# Patient Record
Sex: FEMALE | Race: WHITE | URBAN - METROPOLITAN AREA
[De-identification: names, ages, dates, MRNs, and addresses within clinical notes are randomized per-mention and may not be internally consistent; named-entity substitution may affect disease eponyms.]

---

## 2023-10-03 ENCOUNTER — TELEMEDICINE (OUTPATIENT)
Dept: FAMILY MEDICINE CLINIC | Facility: CLINIC | Age: 28
End: 2023-10-03
Payer: MEDICAID

## 2023-10-03 DIAGNOSIS — L70.9 ACNE, UNSPECIFIED ACNE TYPE: Primary | ICD-10-CM

## 2023-10-03 DIAGNOSIS — F90.9 ATTENTION DEFICIT HYPERACTIVITY DISORDER (ADHD), UNSPECIFIED ADHD TYPE: ICD-10-CM

## 2023-10-03 PROCEDURE — 99202 OFFICE O/P NEW SF 15 MIN: CPT | Performed by: FAMILY MEDICINE

## 2023-10-03 RX ORDER — SPIRONOLACTONE 100 MG/1
100 TABLET, FILM COATED ORAL DAILY
Qty: 30 TABLET | Refills: 5 | Status: SHIPPED | OUTPATIENT
Start: 2023-10-03

## 2023-10-03 NOTE — PROGRESS NOTES
Subjective:   Patient ID: Julisa Velasco is a 34year old female. This visit is conducted using Telemedicine with live, interactive video and audio during this Coronavirus pandemic. Please note that the following visit was completed using two-way, real-time interactive audio and/or video communication. This has been done in good aristides to provide continuity of care in the best interest of the provider-patient relationship, due to the ongoing public health crisis/national emergency and because of restrictions of visitation. There are limitations of this visit as no physical exam could be performed. Every conscious effort was taken to allow for sufficient and adequate time. This billing was spent on reviewing labs, medications, radiology tests and decision making. Appropriate medical decision-making and tests are ordered as detailed in the plan of care above    Virtual Telephone Check-In    Julisa Velasco verbally consents to a Virtual/Telephone Check-In visit on 10/03/23. Patient has been referred to the [facility] website at [email or web address] to review the yearly Consent to Treat document. Patient understands and accepts financial responsibility for any deductible, co-insurance and/or co-pays associated with this service. Duration of the service: 7 minutes      Summary of topics discussed: medications    Patient is here for follow up for chronic medical issues- acne and ADD. The patient is compliant with medications and no side effects. There are no acute issues and patient is requesting refills on aldactone for acne. The patient states medications have been helpful and effective. No side effects and was getting from her previous dermatologist. Pt just moved back to [place]. Pt also has hx of ADD and has been on adderall. Looking for new provider for this.  Was seeing psychiatrist in past.      Marie Wei MD                  History/Other:   Review of Systems  Current Outpatient Medications Medication Sig Dispense Refill    spironolactone 100 MG Oral Tab Take 1 tablet (100 mg total) by mouth daily. 30 tablet 5     Allergies:Not on File    Objective:   Physical Exam  Constitutional:       Appearance: Normal appearance. Neurological:      Mental Status: She is alert. Psychiatric:         Mood and Affect: Mood normal.         Behavior: Behavior normal.         Thought Content: Thought content normal.         Judgment: Judgment normal.         Assessment & Plan:   Acne, unspecified acne type:  - Stable: medication reviewed and renewed; To continue present treatment; To call if problems; Routine follow up with a dermatologist here for future refills    Attention deficit hyperactivity disorder (ADHD), unspecified ADHD type  - After discussion, will send to behavioral health provided for further treatment; Behavioral health navigator to help coordinate care. Information provided and referral generated. -  VIDEO VISIT done. No orders of the defined types were placed in this encounter. Meds This Visit:  Requested Prescriptions     Signed Prescriptions Disp Refills    spironolactone 100 MG Oral Tab 30 tablet 5     Sig: Take 1 tablet (100 mg total) by mouth daily.        Imaging & Referrals:  OP REFERRAL TO PSYCHIATRY  OP REFERRAL TO [facility]